# Patient Record
Sex: FEMALE | Race: WHITE | NOT HISPANIC OR LATINO | Employment: FULL TIME | URBAN - METROPOLITAN AREA
[De-identification: names, ages, dates, MRNs, and addresses within clinical notes are randomized per-mention and may not be internally consistent; named-entity substitution may affect disease eponyms.]

---

## 2024-05-30 ENCOUNTER — OFFICE VISIT (OUTPATIENT)
Dept: PRIMARY CARE | Facility: EXTERNAL LOCATION | Age: 39
End: 2024-05-30

## 2024-05-30 VITALS
TEMPERATURE: 98.5 F | HEART RATE: 82 BPM | RESPIRATION RATE: 18 BRPM | DIASTOLIC BLOOD PRESSURE: 87 MMHG | OXYGEN SATURATION: 99 % | SYSTOLIC BLOOD PRESSURE: 129 MMHG

## 2024-05-30 DIAGNOSIS — S99.922A INJURY OF TOE ON LEFT FOOT, INITIAL ENCOUNTER: Primary | ICD-10-CM

## 2024-05-30 RX ORDER — SUMATRIPTAN SUCCINATE 25 MG/1
25 TABLET ORAL AS NEEDED
COMMUNITY
Start: 2023-09-15

## 2024-05-30 ASSESSMENT — ENCOUNTER SYMPTOMS
BRUISES/BLEEDS EASILY: 1
COLOR CHANGE: 0
MYALGIAS: 1
MUSCLE WEAKNESS: 0
ARTHRALGIAS: 1
LOSS OF MOTION: 0
NUMBNESS: 0
INABILITY TO BEAR WEIGHT: 0
WOUND: 0
LOSS OF SENSATION: 0
ACTIVITY CHANGE: 0
TINGLING: 0
APPETITE CHANGE: 0

## 2024-05-30 ASSESSMENT — PAIN SCALES - GENERAL: PAINLEVEL: 4

## 2024-05-30 NOTE — PATIENT INSTRUCTIONS
-tape for support  -no limitations  -ibuprofen/tylenol for pain  -ice  -follow up if no improvement in 5-7 days.

## 2024-05-30 NOTE — PROGRESS NOTES
Subjective   Patient ID: Jayda Hannon is a 39 y.o. female who presents for Toe Injury (4th toe left foot).    Tripped over a couch that was moved into the hallway at work. 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 Bruising immediately. Full ROM. Toe nail is intact. Denies numbness/tingling.     Foot Injury   The incident occurred 3 to 6 hours ago. The incident occurred at work. The pain is mild. The pain has been Intermittent since onset. Pertinent negatives include no inability to bear weight, loss of motion, loss of sensation, muscle weakness, numbness or tingling. She reports no foreign bodies present. The symptoms are aggravated by movement and palpation. She has tried immobilization for the symptoms. The treatment provided mild relief.        Review of Systems   Constitutional:  Negative for activity change and appetite change.   Musculoskeletal:  Positive for arthralgias and myalgias. Negative for gait problem.        Pain in left 4th toe     Skin:  Negative for color change, pallor, rash and wound.   Neurological:  Negative for tingling and numbness.   Hematological:  Bruises/bleeds easily.       Objective   /87   Pulse 82   Temp 36.9 °C (98.5 °F)   Resp 18   LMP 05/10/2024 (Exact Date)   SpO2 99%     Physical Exam  Constitutional:       General: She is not in acute distress.  Eyes:      Conjunctiva/sclera: Conjunctivae normal.   Musculoskeletal:         General: Tenderness and signs of injury present. No swelling or deformity.      Right lower leg: No edema.      Left lower leg: No edema.   Skin:     General: Skin is warm and dry.      Capillary Refill: Capillary refill takes less than 2 seconds.      Coloration: Skin is not pale.      Findings: Bruising present. No erythema, lesion or rash.      Comments: Bruising of left 4th toe. ROM intact with minimal discomfort. Mild edema. Dorsal and PT pulses palpable +2. Normal gait.    Neurological:      General: No focal  deficit present.      Mental Status: She is alert. Mental status is at baseline.   Psychiatric:         Mood and Affect: Mood normal.         Behavior: Behavior normal.         Thought Content: Thought content normal.         Judgment: Judgment normal.         Assessment/Plan   Diagnoses and all orders for this visit:  Injury of toe on left foot, initial encounter    -tape for support  -no limitations  -ibuprofen/tylenol for pain  -ice  -follow up if no improvement in 5-7 days.